# Patient Record
Sex: MALE | NOT HISPANIC OR LATINO | URBAN - METROPOLITAN AREA
[De-identification: names, ages, dates, MRNs, and addresses within clinical notes are randomized per-mention and may not be internally consistent; named-entity substitution may affect disease eponyms.]

---

## 2018-01-03 ENCOUNTER — EMERGENCY (EMERGENCY)
Facility: HOSPITAL | Age: 58
LOS: 1 days | Discharge: HOME | End: 2018-01-03
Attending: EMERGENCY MEDICINE | Admitting: EMERGENCY MEDICINE
Payer: COMMERCIAL

## 2018-01-03 VITALS
DIASTOLIC BLOOD PRESSURE: 87 MMHG | TEMPERATURE: 98 F | RESPIRATION RATE: 18 BRPM | SYSTOLIC BLOOD PRESSURE: 138 MMHG | HEART RATE: 82 BPM

## 2018-01-03 LAB
ALBUMIN SERPL ELPH-MCNC: 4.1 G/DL — SIGNIFICANT CHANGE UP (ref 3.3–5)
ALP SERPL-CCNC: 48 U/L — SIGNIFICANT CHANGE UP (ref 40–120)
ALT FLD-CCNC: 15 U/L — SIGNIFICANT CHANGE UP (ref 4–41)
AST SERPL-CCNC: 16 U/L — SIGNIFICANT CHANGE UP (ref 4–40)
BASOPHILS # BLD AUTO: 0.02 K/UL — SIGNIFICANT CHANGE UP (ref 0–0.2)
BASOPHILS NFR BLD AUTO: 0.3 % — SIGNIFICANT CHANGE UP (ref 0–2)
BILIRUB SERPL-MCNC: 0.4 MG/DL — SIGNIFICANT CHANGE UP (ref 0.2–1.2)
BUN SERPL-MCNC: 19 MG/DL — SIGNIFICANT CHANGE UP (ref 7–23)
CALCIUM SERPL-MCNC: 8.7 MG/DL — SIGNIFICANT CHANGE UP (ref 8.4–10.5)
CHLORIDE SERPL-SCNC: 107 MMOL/L — SIGNIFICANT CHANGE UP (ref 98–107)
CO2 SERPL-SCNC: 30 MMOL/L — SIGNIFICANT CHANGE UP (ref 22–31)
CREAT SERPL-MCNC: 1.1 MG/DL — SIGNIFICANT CHANGE UP (ref 0.5–1.3)
EOSINOPHIL # BLD AUTO: 0.07 K/UL — SIGNIFICANT CHANGE UP (ref 0–0.5)
EOSINOPHIL NFR BLD AUTO: 0.9 % — SIGNIFICANT CHANGE UP (ref 0–6)
GLUCOSE SERPL-MCNC: 85 MG/DL — SIGNIFICANT CHANGE UP (ref 70–99)
HCT VFR BLD CALC: 44.6 % — SIGNIFICANT CHANGE UP (ref 39–50)
HGB BLD-MCNC: 14.9 G/DL — SIGNIFICANT CHANGE UP (ref 13–17)
IMM GRANULOCYTES # BLD AUTO: 0.03 # — SIGNIFICANT CHANGE UP
IMM GRANULOCYTES NFR BLD AUTO: 0.4 % — SIGNIFICANT CHANGE UP (ref 0–1.5)
LYMPHOCYTES # BLD AUTO: 2.09 K/UL — SIGNIFICANT CHANGE UP (ref 1–3.3)
LYMPHOCYTES # BLD AUTO: 28 % — SIGNIFICANT CHANGE UP (ref 13–44)
MCHC RBC-ENTMCNC: 25.1 PG — LOW (ref 27–34)
MCHC RBC-ENTMCNC: 33.4 % — SIGNIFICANT CHANGE UP (ref 32–36)
MCV RBC AUTO: 75.2 FL — LOW (ref 80–100)
MONOCYTES # BLD AUTO: 0.77 K/UL — SIGNIFICANT CHANGE UP (ref 0–0.9)
MONOCYTES NFR BLD AUTO: 10.3 % — SIGNIFICANT CHANGE UP (ref 2–14)
NEUTROPHILS # BLD AUTO: 4.48 K/UL — SIGNIFICANT CHANGE UP (ref 1.8–7.4)
NEUTROPHILS NFR BLD AUTO: 60.1 % — SIGNIFICANT CHANGE UP (ref 43–77)
NRBC # FLD: 0 — SIGNIFICANT CHANGE UP
PLATELET # BLD AUTO: 121 K/UL — LOW (ref 150–400)
PMV BLD: 10.9 FL — SIGNIFICANT CHANGE UP (ref 7–13)
POTASSIUM SERPL-MCNC: 4.2 MMOL/L — SIGNIFICANT CHANGE UP (ref 3.5–5.3)
POTASSIUM SERPL-SCNC: 4.2 MMOL/L — SIGNIFICANT CHANGE UP (ref 3.5–5.3)
PROT SERPL-MCNC: 6.4 G/DL — SIGNIFICANT CHANGE UP (ref 6–8.3)
RBC # BLD: 5.93 M/UL — HIGH (ref 4.2–5.8)
RBC # FLD: 15.3 % — HIGH (ref 10.3–14.5)
SODIUM SERPL-SCNC: 146 MMOL/L — HIGH (ref 135–145)
WBC # BLD: 7.46 K/UL — SIGNIFICANT CHANGE UP (ref 3.8–10.5)
WBC # FLD AUTO: 7.46 K/UL — SIGNIFICANT CHANGE UP (ref 3.8–10.5)

## 2018-01-03 PROCEDURE — 99284 EMERGENCY DEPT VISIT MOD MDM: CPT

## 2018-01-03 PROCEDURE — 73564 X-RAY EXAM KNEE 4 OR MORE: CPT | Mod: 26,LT,RT

## 2018-01-03 PROCEDURE — 73562 X-RAY EXAM OF KNEE 3: CPT | Mod: 26,50

## 2018-01-03 RX ORDER — IBUPROFEN 200 MG
600 TABLET ORAL ONCE
Qty: 0 | Refills: 0 | Status: COMPLETED | OUTPATIENT
Start: 2018-01-03 | End: 2018-01-03

## 2018-01-03 RX ADMIN — Medication 600 MILLIGRAM(S): at 20:29

## 2018-01-03 NOTE — ED PROVIDER NOTE - ATTENDING CONTRIBUTION TO CARE
ED Attending Dr. Glez: 56 yo male with spinal stenosis in ED with "stiffness" (denies pain) to both knees chronically but worse today, L>>R.  Was seen yesterday at outside hospital for chills, no specific diagnosis made and no knee stiffness at that time.  Today stiffness worsened and so pt came to ED.  No CP/SOB, N/V/D, fever or chills currently.  On exam pt well appearing, in NAD, heart RRR, lungs CTAB, abd NTND, strength 5/5 in all extremities and skin without rash.  Left knee diffusely swollen but no erythema or skin changes, no TTP, but some pain with flexion of left knee, although full ROM to left knee.  Right knee normal appearing and nontender.  Very low suspicion for septic joint, more consistent with arthritis or knee joint effusion of unknown origin (?soft tissue injury).

## 2018-01-03 NOTE — ED PROVIDER NOTE - MEDICAL DECISION MAKING DETAILS
56 y/o male with pmhx of spinal stenosis, presents to ED for acute on chronic b/l knee discomfort and left knee swelling x 1 day. low suspicion for septic joint. possible inflammatory arthritis. plan: labs, NSAIDs, xray, reassess

## 2018-01-03 NOTE — ED PROVIDER NOTE - PLAN OF CARE
Rest, ice, elevate area.  Take Motrin 600mg (three over the counter pills) every 6-8 hrs with food for pain.  Follow up with PMD within 48-72 hrs. Follow up with the Orthopedist doctor within the week, referral list given. Any worsening pain, fever, chills, redness, swelling, weakness, numbness or any NEW CONCERNING symptoms return to the Emergency Dept. Rest, ice, elevate area.  Take Motrin 600mg (three over the counter pills) every 6-8 hrs with food for pain.  Follow up with PMD within 48-72 hrs, bring your blood work results with you for low platelets. Follow up with the Orthopedist doctor within the week, referral list given. Any worsening pain, fever, chills, redness, swelling, weakness, numbness or any NEW CONCERNING symptoms return to the Emergency Dept.

## 2018-01-03 NOTE — ED PROVIDER NOTE - CARE PLAN
Principal Discharge DX:	Knee pain, bilateral Principal Discharge DX:	Knee pain, bilateral  Instructions for follow-up, activity and diet:	Rest, ice, elevate area.  Take Motrin 600mg (three over the counter pills) every 6-8 hrs with food for pain.  Follow up with PMD within 48-72 hrs. Follow up with the Orthopedist doctor within the week, referral list given. Any worsening pain, fever, chills, redness, swelling, weakness, numbness or any NEW CONCERNING symptoms return to the Emergency Dept. Principal Discharge DX:	Knee pain, bilateral  Instructions for follow-up, activity and diet:	Rest, ice, elevate area.  Take Motrin 600mg (three over the counter pills) every 6-8 hrs with food for pain.  Follow up with PMD within 48-72 hrs, bring your blood work results with you for low platelets. Follow up with the Orthopedist doctor within the week, referral list given. Any worsening pain, fever, chills, redness, swelling, weakness, numbness or any NEW CONCERNING symptoms return to the Emergency Dept.

## 2018-01-03 NOTE — ED PROVIDER NOTE - OBJECTIVE STATEMENT
58 y/o male with pmhx of spinal stenosis, presents to ED for acute on chronic b/l knee discomfort and left knee swelling x 1 day. Pt states he was seen at [facility] yesterday and discharge for c/o "chills" no knee pain at that time, no fever, cough, abd pain, n/v/d, dysuria, sore throat, recent illness, no IV drug use. Blood work was drawn and discharged home, thinks he was having chills due to the cold weather in [place] and his heat not working as well at home, visiting from [place] traveled last week 2 hr flight. Pt c/o b/l knee stiffness worse with the cold, woke up this morning with his left knee swollen, pain only with flexion and walking. Ambulating although limping due to discomfort. No redness, rashes, joint pain, tick exposure, cp, sob, weakness, numbness, calf pain/swelling, hx of septic knee or dvt. 56 y/o male with pmhx of spinal stenosis, presents to ED for acute on chronic b/l knee discomfort and left knee swelling x 1 day. Pt states he was seen at [facility] yesterday and discharge for c/o "chills" no knee pain at that time, no fever, cough, abd pain, n/v/d, dysuria, sore throat, recent illness, no IV drug use. Blood work was drawn and discharged home, thinks he was having chills due to the cold weather in [place] and his heat not working as well at home, visiting from [place] traveled last week 2 hr flight. Pt c/o b/l knee stiffness worse with the cold, woke up this morning with his left knee swollen, pain only with flexion and walking. Ambulating although limping due to discomfort. No fever, chills, redness, rashes, joint pain, tick exposure, cp, sob, weakness, numbness, tingling, trauma/fall, calf pain/swelling, hx of septic knee or dvt. 56 y/o male with pmhx of spinal stenosis, presents to ED for acute on chronic b/l knee discomfort and left knee swelling x 1 day. Pt states he was seen at [facility] yesterday and discharge for c/o "chills" no knee pain at that time, no fever, cough, abd pain, n/v/d, dysuria, sore throat, recent illness, no IV drug use. Blood work was drawn and discharged home, thinks he was having chills due to the cold weather in [place] and his heat not working as well at home, visiting from [place] traveled last week 2 hr flight. Pt c/o b/l knee stiffness worse with the cold, woke up this morning with his left knee swollen, pain only with flexion and walking. Swelling went down when he soaked his knees in bath. Ambulating although limping due to discomfort. No fever, chills, redness, rashes, joint pain, tick exposure, cp, sob, weakness, numbness, tingling, trauma/fall, calf pain/swelling, hx of septic knee or dvt.

## 2018-01-03 NOTE — ED PROVIDER NOTE - PROGRESS NOTE DETAILS
DANIELA Wheeler - pt ROM improved after pain meds. ambulating well. amenable for d/c with ortho follow up. DANIELA Wheeler - pt ROM improved after pain meds. ambulating well. amenable for d/c with ortho/rheum follow up. given refferal lists.

## 2018-01-03 NOTE — ED PROVIDER NOTE - PHYSICAL EXAMINATION
Left knee: Limited flexion due to pain. +effusion of lateral suprapatella. No bruising, redness or streaking. Non-tender joint. Right knee: full ROM, nontender. No swelling. No bruising, redness or streaking. Non-tender joint. No calf tenderness b/l. dp pulse 2+ b/l. Sensation intact throughout b/l.

## 2018-03-28 PROBLEM — M48.00 SPINAL STENOSIS, SITE UNSPECIFIED: Chronic | Status: ACTIVE | Noted: 2018-01-03

## 2018-03-28 PROBLEM — Z00.00 ENCOUNTER FOR PREVENTIVE HEALTH EXAMINATION: Status: ACTIVE | Noted: 2018-03-28

## 2018-03-29 ENCOUNTER — APPOINTMENT (OUTPATIENT)
Dept: NEUROSURGERY | Facility: CLINIC | Age: 58
End: 2018-03-29
Payer: COMMERCIAL

## 2018-03-29 VITALS
WEIGHT: 156 LBS | DIASTOLIC BLOOD PRESSURE: 85 MMHG | BODY MASS INDEX: 23.11 KG/M2 | TEMPERATURE: 85 F | RESPIRATION RATE: 16 BRPM | HEART RATE: 86 BPM | OXYGEN SATURATION: 98 % | HEIGHT: 69 IN | SYSTOLIC BLOOD PRESSURE: 138 MMHG

## 2018-03-29 DIAGNOSIS — M54.16 RADICULOPATHY, LUMBAR REGION: ICD-10-CM

## 2018-03-29 DIAGNOSIS — Z78.9 OTHER SPECIFIED HEALTH STATUS: ICD-10-CM

## 2018-03-29 DIAGNOSIS — M54.5 LOW BACK PAIN: ICD-10-CM

## 2018-03-29 PROCEDURE — 99204 OFFICE O/P NEW MOD 45 MIN: CPT

## 2018-04-03 PROBLEM — M54.16 LUMBAR RADICULOPATHY: Status: ACTIVE | Noted: 2018-04-03

## 2018-04-03 PROBLEM — M54.5 LOWER BACK PAIN: Status: ACTIVE | Noted: 2018-04-03

## 2018-04-03 RX ORDER — CYCLOBENZAPRINE HYDROCHLORIDE 10 MG/1
10 TABLET, FILM COATED ORAL
Refills: 0 | Status: ACTIVE | COMMUNITY

## 2018-04-03 RX ORDER — MELOXICAM 7.5 MG/1
7.5 TABLET ORAL
Refills: 0 | Status: ACTIVE | COMMUNITY

## 2018-04-03 RX ORDER — PREGABALIN 100 MG/1
100 CAPSULE ORAL
Refills: 0 | Status: ACTIVE | COMMUNITY

## 2018-04-03 RX ORDER — NAPROXEN SODIUM 550 MG/1
550 TABLET ORAL
Refills: 0 | Status: ACTIVE | COMMUNITY

## 2018-04-03 RX ORDER — METHOCARBAMOL 500 MG/1
500 TABLET, FILM COATED ORAL
Refills: 0 | Status: ACTIVE | COMMUNITY

## 2018-04-04 PROBLEM — Z78.9 NON-SMOKER: Status: ACTIVE | Noted: 2018-03-29

## 2018-04-04 PROBLEM — Z78.9 DOES NOT USE ILLICIT DRUGS: Status: ACTIVE | Noted: 2018-03-29

## 2022-09-04 NOTE — ED PROVIDER NOTE - CARDIAC CAPILLARY REFILL
If you are a smoker, it is important for your health to stop smoking. Please be aware that second hand smoke is also harmful.
less than or equal to 2 seconds